# Patient Record
Sex: FEMALE | Race: OTHER | NOT HISPANIC OR LATINO
[De-identification: names, ages, dates, MRNs, and addresses within clinical notes are randomized per-mention and may not be internally consistent; named-entity substitution may affect disease eponyms.]

---

## 2017-01-04 ENCOUNTER — APPOINTMENT (OUTPATIENT)
Dept: OBGYN | Facility: CLINIC | Age: 32
End: 2017-01-04

## 2017-01-18 ENCOUNTER — APPOINTMENT (OUTPATIENT)
Dept: ANTEPARTUM | Facility: CLINIC | Age: 32
End: 2017-01-18

## 2017-01-18 ENCOUNTER — ASOB RESULT (OUTPATIENT)
Age: 32
End: 2017-01-18

## 2017-01-18 ENCOUNTER — APPOINTMENT (OUTPATIENT)
Dept: OBGYN | Facility: CLINIC | Age: 32
End: 2017-01-18

## 2017-02-01 ENCOUNTER — LABORATORY RESULT (OUTPATIENT)
Age: 32
End: 2017-02-01

## 2017-02-01 ENCOUNTER — APPOINTMENT (OUTPATIENT)
Dept: OBGYN | Facility: CLINIC | Age: 32
End: 2017-02-01

## 2017-02-04 ENCOUNTER — LABORATORY RESULT (OUTPATIENT)
Age: 32
End: 2017-02-04

## 2017-02-07 ENCOUNTER — APPOINTMENT (OUTPATIENT)
Dept: OBGYN | Facility: CLINIC | Age: 32
End: 2017-02-07

## 2017-02-13 ENCOUNTER — APPOINTMENT (OUTPATIENT)
Dept: OBGYN | Facility: CLINIC | Age: 32
End: 2017-02-13

## 2017-02-21 ENCOUNTER — APPOINTMENT (OUTPATIENT)
Dept: OBGYN | Facility: CLINIC | Age: 32
End: 2017-02-21

## 2017-02-23 ENCOUNTER — INPATIENT (INPATIENT)
Facility: HOSPITAL | Age: 32
LOS: 3 days | Discharge: HOME | End: 2017-02-27
Payer: COMMERCIAL

## 2017-02-23 VITALS — WEIGHT: 171.96 LBS | HEIGHT: 60 IN

## 2017-02-23 DIAGNOSIS — O26.893 OTHER SPECIFIED PREGNANCY RELATED CONDITIONS, THIRD TRIMESTER: ICD-10-CM

## 2017-02-23 DIAGNOSIS — O47.1 FALSE LABOR AT OR AFTER 37 COMPLETED WEEKS OF GESTATION: ICD-10-CM

## 2017-02-23 LAB
APPEARANCE UR: ABNORMAL
BASOPHILS # BLD AUTO: 0 K/UL — SIGNIFICANT CHANGE UP (ref 0–0.2)
BASOPHILS NFR BLD AUTO: 0.2 % — SIGNIFICANT CHANGE UP (ref 0–2)
BILIRUB UR-MCNC: NEGATIVE — SIGNIFICANT CHANGE UP
BLD GP AB SCN SERPL QL: SIGNIFICANT CHANGE UP
COLOR SPEC: YELLOW — SIGNIFICANT CHANGE UP
DIFF PNL FLD: ABNORMAL
EOSINOPHIL # BLD AUTO: 0.1 K/UL — SIGNIFICANT CHANGE UP (ref 0–0.5)
EOSINOPHIL NFR BLD AUTO: 0.8 % — SIGNIFICANT CHANGE UP (ref 0–6)
EPI CELLS # UR: ABNORMAL
GLUCOSE UR QL: NEGATIVE MG/DL — SIGNIFICANT CHANGE UP
HCT VFR BLD CALC: 33.6 % — LOW (ref 37–47)
HGB BLD-MCNC: 11.5 G/DL — LOW (ref 12–16)
KETONES UR-MCNC: NEGATIVE — SIGNIFICANT CHANGE UP
LEUKOCYTE ESTERASE UR-ACNC: NEGATIVE — SIGNIFICANT CHANGE UP
LYMPHOCYTES # BLD AUTO: 2.4 K/UL — SIGNIFICANT CHANGE UP (ref 1–4.8)
LYMPHOCYTES # BLD AUTO: 23.3 % — SIGNIFICANT CHANGE UP (ref 20–55)
MCHC RBC-ENTMCNC: 30.2 PG — SIGNIFICANT CHANGE UP (ref 27–31)
MCHC RBC-ENTMCNC: 34.2 G/DL — SIGNIFICANT CHANGE UP (ref 32–36)
MCV RBC AUTO: 88.2 FL — SIGNIFICANT CHANGE UP (ref 81–99)
MONOCYTES # BLD AUTO: 0.6 K/UL — SIGNIFICANT CHANGE UP (ref 0–0.8)
MONOCYTES NFR BLD AUTO: 6.1 % — SIGNIFICANT CHANGE UP (ref 3–10)
NEUTROPHILS # BLD AUTO: 7 K/UL — SIGNIFICANT CHANGE UP (ref 1.8–8)
NEUTROPHILS NFR BLD AUTO: 66.1 % — SIGNIFICANT CHANGE UP (ref 37–73)
NITRITE UR-MCNC: NEGATIVE — SIGNIFICANT CHANGE UP
PH UR: 6.5 — SIGNIFICANT CHANGE UP (ref 4.8–8)
PLATELET # BLD AUTO: 173 K/UL — SIGNIFICANT CHANGE UP (ref 150–400)
PROT UR-MCNC: 30 MG/DL
RBC # BLD: 3.81 M/UL — LOW (ref 4.4–5.2)
RBC # FLD: 13.3 % — SIGNIFICANT CHANGE UP (ref 11–15.6)
RBC CASTS # UR COMP ASSIST: ABNORMAL /HPF (ref 0–4)
RPR SERPL-ACNC: SIGNIFICANT CHANGE UP
SP GR SPEC: 1.01 — SIGNIFICANT CHANGE UP (ref 1.01–1.02)
TYPE + AB SCN PNL BLD: SIGNIFICANT CHANGE UP
UROBILINOGEN FLD QL: NEGATIVE MG/DL — SIGNIFICANT CHANGE UP
WBC # BLD: 10.5 K/UL — SIGNIFICANT CHANGE UP (ref 4.8–10.8)
WBC # FLD AUTO: 10.5 K/UL — SIGNIFICANT CHANGE UP (ref 4.8–10.8)
WBC UR QL: SIGNIFICANT CHANGE UP

## 2017-02-23 RX ORDER — AMPICILLIN TRIHYDRATE 250 MG
2 CAPSULE ORAL EVERY 6 HOURS
Qty: 0 | Refills: 0 | Status: DISCONTINUED | OUTPATIENT
Start: 2017-02-23 | End: 2017-02-24

## 2017-02-23 RX ORDER — SODIUM CHLORIDE 9 MG/ML
1000 INJECTION, SOLUTION INTRAVENOUS ONCE
Qty: 0 | Refills: 0 | Status: COMPLETED | OUTPATIENT
Start: 2017-02-23 | End: 2017-02-24

## 2017-02-23 RX ORDER — CITRIC ACID/SODIUM CITRATE 300-500 MG
30 SOLUTION, ORAL ORAL ONCE
Qty: 0 | Refills: 0 | Status: COMPLETED | OUTPATIENT
Start: 2017-02-23 | End: 2017-02-23

## 2017-02-23 RX ORDER — OXYTOCIN 10 UNIT/ML
333.33 VIAL (ML) INJECTION
Qty: 20 | Refills: 0 | Status: COMPLETED | OUTPATIENT
Start: 2017-02-23

## 2017-02-23 RX ORDER — SODIUM CHLORIDE 9 MG/ML
1000 INJECTION, SOLUTION INTRAVENOUS
Qty: 0 | Refills: 0 | Status: DISCONTINUED | OUTPATIENT
Start: 2017-02-23 | End: 2017-02-24

## 2017-02-23 RX ORDER — OXYTOCIN 10 UNIT/ML
2 VIAL (ML) INJECTION
Qty: 30 | Refills: 0 | Status: DISCONTINUED | OUTPATIENT
Start: 2017-02-23 | End: 2017-02-27

## 2017-02-23 RX ORDER — BUTORPHANOL TARTRATE 2 MG/ML
2 INJECTION, SOLUTION INTRAMUSCULAR; INTRAVENOUS
Qty: 0 | Refills: 0 | Status: DISCONTINUED | OUTPATIENT
Start: 2017-02-23 | End: 2017-02-23

## 2017-02-23 RX ADMIN — Medication 30 MILLILITER(S): at 09:57

## 2017-02-23 RX ADMIN — BUTORPHANOL TARTRATE 2 MILLIGRAM(S): 2 INJECTION, SOLUTION INTRAMUSCULAR; INTRAVENOUS at 06:12

## 2017-02-23 RX ADMIN — SODIUM CHLORIDE 150 MILLILITER(S): 9 INJECTION, SOLUTION INTRAVENOUS at 18:04

## 2017-02-23 RX ADMIN — SODIUM CHLORIDE 150 MILLILITER(S): 9 INJECTION, SOLUTION INTRAVENOUS at 08:12

## 2017-02-23 RX ADMIN — BUTORPHANOL TARTRATE 2 MILLIGRAM(S): 2 INJECTION, SOLUTION INTRAMUSCULAR; INTRAVENOUS at 06:41

## 2017-02-23 RX ADMIN — Medication 2 MILLIUNIT(S)/MIN: at 23:55

## 2017-02-23 RX ADMIN — SODIUM CHLORIDE 150 MILLILITER(S): 9 INJECTION, SOLUTION INTRAVENOUS at 02:47

## 2017-02-23 RX ADMIN — Medication 216 GRAM(S): at 23:51

## 2017-02-23 RX ADMIN — SODIUM CHLORIDE 150 MILLILITER(S): 9 INJECTION, SOLUTION INTRAVENOUS at 10:56

## 2017-02-24 ENCOUNTER — TRANSCRIPTION ENCOUNTER (OUTPATIENT)
Age: 32
End: 2017-02-24

## 2017-02-24 LAB
BASE EXCESS BLDCOV CALC-SCNC: -2.1 MMOL/L — SIGNIFICANT CHANGE UP (ref -9.3–0.3)
FETAL SCREEN: SIGNIFICANT CHANGE UP
GAS PNL BLDCOV: 7.35 — SIGNIFICANT CHANGE UP (ref 7.11–7.36)
HCO3 BLDCOV-SCNC: 23 MMOL/L — SIGNIFICANT CHANGE UP (ref 17–25)
PCO2 BLDCOV: 42.8 MMHG — SIGNIFICANT CHANGE UP (ref 27–49.4)
PO2 BLDCOA: 41 MMHG — SIGNIFICANT CHANGE UP (ref 17.4–41)
SAO2 % BLDCOV: SIGNIFICANT CHANGE UP

## 2017-02-24 PROCEDURE — 59510 CESAREAN DELIVERY: CPT

## 2017-02-24 RX ORDER — ONDANSETRON 8 MG/1
4 TABLET, FILM COATED ORAL EVERY 6 HOURS
Qty: 0 | Refills: 0 | Status: DISCONTINUED | OUTPATIENT
Start: 2017-02-24 | End: 2017-02-27

## 2017-02-24 RX ORDER — LANOLIN
1 OINTMENT (GRAM) TOPICAL
Qty: 0 | Refills: 0 | Status: DISCONTINUED | OUTPATIENT
Start: 2017-02-24 | End: 2017-02-27

## 2017-02-24 RX ORDER — SODIUM CHLORIDE 9 MG/ML
1000 INJECTION, SOLUTION INTRAVENOUS
Qty: 0 | Refills: 0 | Status: DISCONTINUED | OUTPATIENT
Start: 2017-02-24 | End: 2017-02-27

## 2017-02-24 RX ORDER — CEFAZOLIN SODIUM 1 G
1000 VIAL (EA) INJECTION EVERY 8 HOURS
Qty: 0 | Refills: 0 | Status: DISCONTINUED | OUTPATIENT
Start: 2017-02-24 | End: 2017-02-27

## 2017-02-24 RX ORDER — ACETAMINOPHEN 500 MG
650 TABLET ORAL EVERY 6 HOURS
Qty: 0 | Refills: 0 | Status: DISCONTINUED | OUTPATIENT
Start: 2017-02-24 | End: 2017-02-27

## 2017-02-24 RX ORDER — DIPHENHYDRAMINE HCL 50 MG
25 CAPSULE ORAL EVERY 6 HOURS
Qty: 0 | Refills: 0 | Status: DISCONTINUED | OUTPATIENT
Start: 2017-02-24 | End: 2017-02-27

## 2017-02-24 RX ORDER — OXYTOCIN 10 UNIT/ML
333.33 VIAL (ML) INJECTION
Qty: 20 | Refills: 0 | Status: DISCONTINUED | OUTPATIENT
Start: 2017-02-24 | End: 2017-02-27

## 2017-02-24 RX ORDER — OXYCODONE HYDROCHLORIDE 5 MG/1
5 TABLET ORAL
Qty: 0 | Refills: 0 | Status: DISCONTINUED | OUTPATIENT
Start: 2017-02-24 | End: 2017-02-27

## 2017-02-24 RX ORDER — KETOROLAC TROMETHAMINE 30 MG/ML
15 SYRINGE (ML) INJECTION EVERY 6 HOURS
Qty: 0 | Refills: 0 | Status: COMPLETED | OUTPATIENT
Start: 2017-02-24 | End: 2017-02-25

## 2017-02-24 RX ORDER — FERROUS SULFATE 325(65) MG
325 TABLET ORAL DAILY
Qty: 0 | Refills: 0 | Status: DISCONTINUED | OUTPATIENT
Start: 2017-02-24 | End: 2017-02-25

## 2017-02-24 RX ORDER — NALOXONE HYDROCHLORIDE 4 MG/.1ML
0.1 SPRAY NASAL
Qty: 0 | Refills: 0 | Status: DISCONTINUED | OUTPATIENT
Start: 2017-02-24 | End: 2017-02-27

## 2017-02-24 RX ORDER — IBUPROFEN 200 MG
600 TABLET ORAL EVERY 6 HOURS
Qty: 0 | Refills: 0 | Status: DISCONTINUED | OUTPATIENT
Start: 2017-02-24 | End: 2017-02-27

## 2017-02-24 RX ORDER — DOCUSATE SODIUM 100 MG
100 CAPSULE ORAL
Qty: 0 | Refills: 0 | Status: DISCONTINUED | OUTPATIENT
Start: 2017-02-24 | End: 2017-02-27

## 2017-02-24 RX ORDER — OXYTOCIN 10 UNIT/ML
41.67 VIAL (ML) INJECTION
Qty: 20 | Refills: 0 | Status: DISCONTINUED | OUTPATIENT
Start: 2017-02-24 | End: 2017-02-27

## 2017-02-24 RX ORDER — CEFAZOLIN SODIUM 1 G
2000 VIAL (EA) INJECTION ONCE
Qty: 0 | Refills: 0 | Status: COMPLETED | OUTPATIENT
Start: 2017-02-24 | End: 2017-02-24

## 2017-02-24 RX ORDER — OXYCODONE HYDROCHLORIDE 5 MG/1
10 TABLET ORAL
Qty: 0 | Refills: 0 | Status: DISCONTINUED | OUTPATIENT
Start: 2017-02-24 | End: 2017-02-27

## 2017-02-24 RX ORDER — TETANUS TOXOID, REDUCED DIPHTHERIA TOXOID AND ACELLULAR PERTUSSIS VACCINE, ADSORBED 5; 2.5; 8; 8; 2.5 [IU]/.5ML; [IU]/.5ML; UG/.5ML; UG/.5ML; UG/.5ML
0.5 SUSPENSION INTRAMUSCULAR ONCE
Qty: 0 | Refills: 0 | Status: DISCONTINUED | OUTPATIENT
Start: 2017-02-24 | End: 2017-02-27

## 2017-02-24 RX ORDER — SIMETHICONE 80 MG/1
80 TABLET, CHEWABLE ORAL EVERY 4 HOURS
Qty: 0 | Refills: 0 | Status: DISCONTINUED | OUTPATIENT
Start: 2017-02-24 | End: 2017-02-27

## 2017-02-24 RX ORDER — GLYCERIN ADULT
1 SUPPOSITORY, RECTAL RECTAL AT BEDTIME
Qty: 0 | Refills: 0 | Status: DISCONTINUED | OUTPATIENT
Start: 2017-02-24 | End: 2017-02-27

## 2017-02-24 RX ORDER — OXYTOCIN 10 UNIT/ML
333.33 VIAL (ML) INJECTION
Qty: 20 | Refills: 0 | Status: DISCONTINUED | OUTPATIENT
Start: 2017-02-24 | End: 2017-02-24

## 2017-02-24 RX ORDER — ACETAMINOPHEN 500 MG
1000 TABLET ORAL ONCE
Qty: 0 | Refills: 0 | Status: COMPLETED | OUTPATIENT
Start: 2017-02-24 | End: 2017-02-24

## 2017-02-24 RX ORDER — CITRIC ACID/SODIUM CITRATE 300-500 MG
30 SOLUTION, ORAL ORAL ONCE
Qty: 0 | Refills: 0 | Status: COMPLETED | OUTPATIENT
Start: 2017-02-24 | End: 2017-02-24

## 2017-02-24 RX ADMIN — Medication 125 MILLIUNIT(S)/MIN: at 12:18

## 2017-02-24 RX ADMIN — Medication 15 MILLIGRAM(S): at 18:28

## 2017-02-24 RX ADMIN — Medication 1000 MILLIGRAM(S): at 12:15

## 2017-02-24 RX ADMIN — Medication 1000 MILLIUNIT(S)/MIN: at 10:13

## 2017-02-24 RX ADMIN — SODIUM CHLORIDE 2000 MILLILITER(S): 9 INJECTION, SOLUTION INTRAVENOUS at 09:32

## 2017-02-24 RX ADMIN — Medication 400 MILLIGRAM(S): at 11:53

## 2017-02-24 RX ADMIN — Medication 15 MILLIGRAM(S): at 23:35

## 2017-02-24 RX ADMIN — Medication 216 GRAM(S): at 05:59

## 2017-02-24 RX ADMIN — Medication 15 MILLIGRAM(S): at 23:51

## 2017-02-24 RX ADMIN — Medication 15 MILLIGRAM(S): at 18:13

## 2017-02-24 RX ADMIN — Medication 100 MILLIGRAM(S): at 09:58

## 2017-02-24 RX ADMIN — Medication 30 MILLILITER(S): at 09:33

## 2017-02-24 RX ADMIN — Medication 100 MILLIGRAM(S): at 22:33

## 2017-02-24 NOTE — DISCHARGE NOTE OB - MATERIALS PROVIDED
[facility] Hearing Screen Program/Breastfeeding Log/[facility] [place] Screening Program/Breastfeeding Guide and Packet/Tdap Vaccination (VIS Pub Date: 2012)/Breastfeeding Mother’s Support Group Information/Guide to Postpartum Care/Birth Certificate Instructions/[place]  Immunization Record/Shaken Baby Prevention Handout/Back To Sleep Handout

## 2017-02-24 NOTE — DISCHARGE NOTE OB - CARE PROVIDERS DIRECT ADDRESSES
[email or web address]

## 2017-02-24 NOTE — DISCHARGE NOTE OB - PATIENT PORTAL LINK FT
“You can access the [facility] Patient Portal, offered by [facility], by registering with the following website: [email or web address]”

## 2017-02-24 NOTE — BRIEF OPERATIVE NOTE - PRE-OP DX
Failed medical induction of labor  02/24/2017    Ry Pa  Prolonged rupture of membranes  02/24/2017    Ry Pa

## 2017-02-24 NOTE — DISCHARGE NOTE OB - CARE PROVIDER_API CALL
Ry Dobson), Obstetrics and Gynecology  [address] [place] [address]  Phone: [phone]  Fax: [phone]

## 2017-02-24 NOTE — DISCHARGE NOTE OB - CARE PLAN
Principal Discharge DX:	 delivery delivered  Goal:	recovery  Instructions for follow-up, activity and diet:	fu thurs staple removal

## 2017-02-25 LAB
BASOPHILS # BLD AUTO: 0 K/UL — SIGNIFICANT CHANGE UP (ref 0–0.2)
BASOPHILS NFR BLD AUTO: 0.1 % — SIGNIFICANT CHANGE UP (ref 0–2)
EOSINOPHIL # BLD AUTO: 0 K/UL — SIGNIFICANT CHANGE UP (ref 0–0.5)
EOSINOPHIL NFR BLD AUTO: 0.3 % — SIGNIFICANT CHANGE UP (ref 0–6)
HCT VFR BLD CALC: 23.5 % — LOW (ref 37–47)
HGB BLD-MCNC: 8.1 G/DL — LOW (ref 12–16)
LYMPHOCYTES # BLD AUTO: 1.4 K/UL — SIGNIFICANT CHANGE UP (ref 1–4.8)
LYMPHOCYTES # BLD AUTO: 12.3 % — LOW (ref 20–55)
MCHC RBC-ENTMCNC: 30.3 PG — SIGNIFICANT CHANGE UP (ref 27–31)
MCHC RBC-ENTMCNC: 34.5 G/DL — SIGNIFICANT CHANGE UP (ref 32–36)
MCV RBC AUTO: 88 FL — SIGNIFICANT CHANGE UP (ref 81–99)
MONOCYTES # BLD AUTO: 0.7 K/UL — SIGNIFICANT CHANGE UP (ref 0–0.8)
MONOCYTES NFR BLD AUTO: 5.7 % — SIGNIFICANT CHANGE UP (ref 3–10)
NEUTROPHILS # BLD AUTO: 9.6 K/UL — HIGH (ref 1.8–8)
NEUTROPHILS NFR BLD AUTO: 80.8 % — HIGH (ref 37–73)
PLATELET # BLD AUTO: 153 K/UL — SIGNIFICANT CHANGE UP (ref 150–400)
RBC # BLD: 2.67 M/UL — LOW (ref 4.4–5.2)
RBC # FLD: 13.6 % — SIGNIFICANT CHANGE UP (ref 11–15.6)
WBC # BLD: 11.8 K/UL — HIGH (ref 4.8–10.8)
WBC # FLD AUTO: 11.8 K/UL — HIGH (ref 4.8–10.8)

## 2017-02-25 RX ORDER — FERROUS SULFATE 325(65) MG
325 TABLET ORAL
Qty: 0 | Refills: 0 | Status: DISCONTINUED | OUTPATIENT
Start: 2017-02-25 | End: 2017-02-27

## 2017-02-25 RX ADMIN — Medication 325 MILLIGRAM(S): at 17:39

## 2017-02-25 RX ADMIN — Medication 1 TABLET(S): at 11:14

## 2017-02-25 RX ADMIN — Medication 15 MILLIGRAM(S): at 06:06

## 2017-02-25 RX ADMIN — Medication 600 MILLIGRAM(S): at 11:14

## 2017-02-25 RX ADMIN — Medication 15 MILLIGRAM(S): at 06:21

## 2017-02-25 RX ADMIN — Medication 600 MILLIGRAM(S): at 17:41

## 2017-02-25 RX ADMIN — Medication 325 MILLIGRAM(S): at 17:51

## 2017-02-25 RX ADMIN — Medication 600 MILLIGRAM(S): at 12:00

## 2017-02-25 RX ADMIN — Medication 100 MILLIGRAM(S): at 06:06

## 2017-02-25 NOTE — PROGRESS NOTE ADULT - SUBJECTIVE AND OBJECTIVE BOX
POD #1    S: patient doing well, no complaints, tolerating diet, pain controlled    O: Vital Signs Last 24 Hrs  T(C): 36.7, Max: 36.7 (02-24 @ 13:04)  T(F): 98, Max: 98.1 (02-24 @ 13:04)  HR: 98 (87 - 116)  BP: 110/65 (103/66 - 142/81)  BP(mean): --  RR: 16 (12 - 23)  SpO2: 98% (96% - 99%)               breasts - not engorged       abdonen - soft, tender       incision - clean, dry, intact       fundus - firm       lochia - minimal       extremities - no calf tenderness                            8.1    11.8  )-----------( 153      ( 25 Feb 2017 07:43 )             23.5               A: POD #1 S/P C/S    P: continue present management

## 2017-02-25 NOTE — PROGRESS NOTE ADULT - SUBJECTIVE AND OBJECTIVE BOX
INTERVAL HPI/OVERNIGHT EVENTS:  31y Female s/p labor epidural converted to c section under epidural anesthesia with duramorph for post op analgesia on 2/23-2/24/17.      Vital Signs Last 24 Hrs  T(C): 36.7, Max: 36.7 (02-24 @ 13:04)  T(F): 98, Max: 98.1 (02-24 @ 13:04)  HR: 98 (87 - 116)  BP: 110/65 (103/66 - 142/81)  BP(mean): --  RR: 16 (12 - 23)  SpO2: 98% (96% - 99%)    Patient seen, doing well, no anesthetic complications or complaints noted or reported.  Pain is controlled.

## 2017-02-26 VITALS
HEART RATE: 99 BPM | TEMPERATURE: 98 F | SYSTOLIC BLOOD PRESSURE: 116 MMHG | OXYGEN SATURATION: 99 % | DIASTOLIC BLOOD PRESSURE: 69 MMHG | RESPIRATION RATE: 18 BRPM

## 2017-02-26 RX ADMIN — Medication 325 MILLIGRAM(S): at 18:03

## 2017-02-26 RX ADMIN — Medication 600 MILLIGRAM(S): at 09:30

## 2017-02-26 RX ADMIN — Medication 100 MILLIGRAM(S): at 14:27

## 2017-02-26 RX ADMIN — Medication 600 MILLIGRAM(S): at 00:20

## 2017-02-26 RX ADMIN — Medication 600 MILLIGRAM(S): at 17:00

## 2017-02-26 RX ADMIN — Medication 325 MILLIGRAM(S): at 14:27

## 2017-02-26 RX ADMIN — Medication 600 MILLIGRAM(S): at 16:10

## 2017-02-26 RX ADMIN — Medication 600 MILLIGRAM(S): at 08:56

## 2017-02-26 RX ADMIN — Medication 325 MILLIGRAM(S): at 08:57

## 2017-02-26 RX ADMIN — Medication 600 MILLIGRAM(S): at 01:00

## 2017-02-26 RX ADMIN — Medication 1 TABLET(S): at 14:27

## 2017-02-26 NOTE — PROGRESS NOTE ADULT - SUBJECTIVE AND OBJECTIVE BOX
POD #2    S: patient doing well, no complaints, tolerating diet, pain controlled    O: Vital Signs Last 24 Hrs  T(C): 36.7, Max: 36.7 (02-25 @ 21:14)  T(F): 98.1, Max: 98.1 (02-25 @ 21:14)  HR: 97 (97 - 100)  BP: 140/83 (127/73 - 140/83)  BP(mean): --  RR: 20 (20 - 20)  SpO2: 96% (96% - 96%)           breasts - not engorged       abdomen - soft, nontender       incision - clean, dry, intact       fundus - firm       lochia - minimal       extremities - no calf tenderness                            8.1    11.8  )-----------( 153      ( 25 Feb 2017 07:43 )             23.5               A: POD #2 S/P C/S    P: continue present management

## 2017-02-27 PROCEDURE — 86900 BLOOD TYPING SEROLOGIC ABO: CPT

## 2017-02-27 PROCEDURE — 82803 BLOOD GASES ANY COMBINATION: CPT

## 2017-02-27 PROCEDURE — 36415 COLL VENOUS BLD VENIPUNCTURE: CPT

## 2017-02-27 PROCEDURE — 85027 COMPLETE CBC AUTOMATED: CPT

## 2017-02-27 PROCEDURE — 86850 RBC ANTIBODY SCREEN: CPT

## 2017-02-27 PROCEDURE — 81001 URINALYSIS AUTO W/SCOPE: CPT

## 2017-02-27 PROCEDURE — 85461 HEMOGLOBIN FETAL: CPT

## 2017-02-27 PROCEDURE — 59050 FETAL MONITOR W/REPORT: CPT

## 2017-02-27 PROCEDURE — 59025 FETAL NON-STRESS TEST: CPT

## 2017-02-27 PROCEDURE — 86592 SYPHILIS TEST NON-TREP QUAL: CPT

## 2017-02-27 PROCEDURE — 86901 BLOOD TYPING SEROLOGIC RH(D): CPT

## 2017-02-27 PROCEDURE — G0463: CPT

## 2017-02-27 RX ADMIN — Medication 600 MILLIGRAM(S): at 00:49

## 2017-03-01 ENCOUNTER — APPOINTMENT (OUTPATIENT)
Dept: OBGYN | Facility: CLINIC | Age: 32
End: 2017-03-01

## 2017-03-02 ENCOUNTER — APPOINTMENT (OUTPATIENT)
Dept: OBGYN | Facility: CLINIC | Age: 32
End: 2017-03-02

## 2017-03-02 VITALS
WEIGHT: 162 LBS | HEIGHT: 60 IN | SYSTOLIC BLOOD PRESSURE: 126 MMHG | BODY MASS INDEX: 31.8 KG/M2 | DIASTOLIC BLOOD PRESSURE: 84 MMHG

## 2017-03-02 DIAGNOSIS — Z33.1 PREGNANT STATE, INCIDENTAL: ICD-10-CM

## 2017-04-17 ENCOUNTER — APPOINTMENT (OUTPATIENT)
Dept: OBGYN | Facility: CLINIC | Age: 32
End: 2017-04-17

## 2017-04-17 VITALS
HEIGHT: 62 IN | SYSTOLIC BLOOD PRESSURE: 110 MMHG | WEIGHT: 148 LBS | BODY MASS INDEX: 27.23 KG/M2 | DIASTOLIC BLOOD PRESSURE: 70 MMHG

## 2017-04-19 LAB — HPV HIGH+LOW RISK DNA PNL CVX: NEGATIVE

## 2017-04-20 LAB — CYTOLOGY CVX/VAG DOC THIN PREP: NORMAL

## 2017-05-30 ENCOUNTER — APPOINTMENT (OUTPATIENT)
Dept: OBGYN | Facility: CLINIC | Age: 32
End: 2017-05-30

## 2017-05-30 VITALS
DIASTOLIC BLOOD PRESSURE: 73 MMHG | HEIGHT: 60 IN | SYSTOLIC BLOOD PRESSURE: 106 MMHG | WEIGHT: 144 LBS | BODY MASS INDEX: 28.27 KG/M2

## 2017-09-12 ENCOUNTER — APPOINTMENT (OUTPATIENT)
Dept: OBGYN | Facility: CLINIC | Age: 32
End: 2017-09-12
Payer: COMMERCIAL

## 2017-09-12 VITALS
HEIGHT: 60 IN | SYSTOLIC BLOOD PRESSURE: 108 MMHG | BODY MASS INDEX: 27.48 KG/M2 | HEART RATE: 97 BPM | WEIGHT: 140 LBS | DIASTOLIC BLOOD PRESSURE: 67 MMHG

## 2017-09-12 DIAGNOSIS — N61.0 MASTITIS WITHOUT ABSCESS: ICD-10-CM

## 2017-09-12 PROCEDURE — 99213 OFFICE O/P EST LOW 20 MIN: CPT

## 2017-09-12 RX ORDER — CEPHALEXIN 500 MG/1
500 TABLET ORAL EVERY 8 HOURS
Qty: 21 | Refills: 0 | Status: ACTIVE | COMMUNITY
Start: 2017-05-30 | End: 1900-01-01
